# Patient Record
Sex: MALE | Race: WHITE | NOT HISPANIC OR LATINO | Employment: FULL TIME | URBAN - METROPOLITAN AREA
[De-identification: names, ages, dates, MRNs, and addresses within clinical notes are randomized per-mention and may not be internally consistent; named-entity substitution may affect disease eponyms.]

---

## 2017-08-14 DIAGNOSIS — Z12.5 SCREENING PSA (PROSTATE SPECIFIC ANTIGEN): ICD-10-CM

## 2017-08-14 DIAGNOSIS — Z00.00 ANNUAL PHYSICAL EXAM: Primary | ICD-10-CM

## 2017-08-17 ENCOUNTER — RESULTS ENCOUNTER (OUTPATIENT)
Dept: FAMILY MEDICINE CLINIC | Facility: CLINIC | Age: 67
End: 2017-08-17

## 2017-08-17 DIAGNOSIS — Z12.5 SCREENING PSA (PROSTATE SPECIFIC ANTIGEN): ICD-10-CM

## 2017-08-17 DIAGNOSIS — Z00.00 ANNUAL PHYSICAL EXAM: ICD-10-CM

## 2017-08-18 LAB
ALBUMIN SERPL-MCNC: 4.3 G/DL (ref 3.5–5.2)
ALBUMIN/GLOB SERPL: 1.7 G/DL
ALP SERPL-CCNC: 74 U/L (ref 39–117)
ALT SERPL-CCNC: 23 U/L (ref 1–41)
APPEARANCE UR: CLEAR
AST SERPL-CCNC: 23 U/L (ref 1–40)
BASOPHILS # BLD AUTO: 0.01 10*3/MM3 (ref 0–0.2)
BASOPHILS NFR BLD AUTO: 0.2 % (ref 0–1.5)
BILIRUB SERPL-MCNC: 1.7 MG/DL (ref 0.1–1.2)
BILIRUB UR QL STRIP: NEGATIVE
BUN SERPL-MCNC: 13 MG/DL (ref 8–23)
BUN/CREAT SERPL: 13.7 (ref 7–25)
CALCIUM SERPL-MCNC: 9.3 MG/DL (ref 8.6–10.5)
CHLORIDE SERPL-SCNC: 102 MMOL/L (ref 98–107)
CHOLEST SERPL-MCNC: 184 MG/DL (ref 0–200)
CO2 SERPL-SCNC: 29.7 MMOL/L (ref 22–29)
COLOR UR: YELLOW
CREAT SERPL-MCNC: 0.95 MG/DL (ref 0.76–1.27)
EOSINOPHIL # BLD AUTO: 0.23 10*3/MM3 (ref 0–0.7)
EOSINOPHIL NFR BLD AUTO: 3.9 % (ref 0.3–6.2)
ERYTHROCYTE [DISTWIDTH] IN BLOOD BY AUTOMATED COUNT: 12.8 % (ref 11.5–14.5)
GLOBULIN SER CALC-MCNC: 2.6 GM/DL
GLUCOSE SERPL-MCNC: 95 MG/DL (ref 65–99)
GLUCOSE UR QL: NEGATIVE
HCT VFR BLD AUTO: 47.8 % (ref 40.4–52.2)
HDLC SERPL-MCNC: 49 MG/DL (ref 40–60)
HGB BLD-MCNC: 15.9 G/DL (ref 13.7–17.6)
HGB UR QL STRIP: NEGATIVE
IMM GRANULOCYTES # BLD: 0 10*3/MM3 (ref 0–0.03)
IMM GRANULOCYTES NFR BLD: 0 % (ref 0–0.5)
KETONES UR QL STRIP: NEGATIVE
LDLC SERPL CALC-MCNC: 118 MG/DL (ref 0–100)
LEUKOCYTE ESTERASE UR QL STRIP: NEGATIVE
LYMPHOCYTES # BLD AUTO: 1.78 10*3/MM3 (ref 0.9–4.8)
LYMPHOCYTES NFR BLD AUTO: 29.8 % (ref 19.6–45.3)
MCH RBC QN AUTO: 31.5 PG (ref 27–32.7)
MCHC RBC AUTO-ENTMCNC: 33.3 G/DL (ref 32.6–36.4)
MCV RBC AUTO: 94.7 FL (ref 79.8–96.2)
MONOCYTES # BLD AUTO: 0.49 10*3/MM3 (ref 0.2–1.2)
MONOCYTES NFR BLD AUTO: 8.2 % (ref 5–12)
NEUTROPHILS # BLD AUTO: 3.46 10*3/MM3 (ref 1.9–8.1)
NEUTROPHILS NFR BLD AUTO: 57.9 % (ref 42.7–76)
NITRITE UR QL STRIP: NEGATIVE
PH UR STRIP: 7 [PH] (ref 5–8)
PLATELET # BLD AUTO: 188 10*3/MM3 (ref 140–500)
POTASSIUM SERPL-SCNC: 4.6 MMOL/L (ref 3.5–5.2)
PROT SERPL-MCNC: 6.9 G/DL (ref 6–8.5)
PROT UR QL STRIP: NEGATIVE
PSA SERPL-MCNC: 1.49 NG/ML (ref 0–4)
RBC # BLD AUTO: 5.05 10*6/MM3 (ref 4.6–6)
SODIUM SERPL-SCNC: 141 MMOL/L (ref 136–145)
SP GR UR: 1.01 (ref 1–1.03)
TRIGL SERPL-MCNC: 85 MG/DL (ref 0–150)
TSH SERPL DL<=0.005 MIU/L-ACNC: 4.38 MIU/ML (ref 0.27–4.2)
UROBILINOGEN UR STRIP-MCNC: NORMAL MG/DL
VLDLC SERPL CALC-MCNC: 17 MG/DL (ref 5–40)
WBC # BLD AUTO: 5.97 10*3/MM3 (ref 4.5–10.7)

## 2017-09-22 ENCOUNTER — OFFICE VISIT (OUTPATIENT)
Dept: FAMILY MEDICINE CLINIC | Facility: CLINIC | Age: 67
End: 2017-09-22

## 2017-09-22 VITALS
SYSTOLIC BLOOD PRESSURE: 108 MMHG | HEART RATE: 63 BPM | OXYGEN SATURATION: 98 % | WEIGHT: 231 LBS | BODY MASS INDEX: 30.62 KG/M2 | HEIGHT: 73 IN | TEMPERATURE: 98 F | DIASTOLIC BLOOD PRESSURE: 58 MMHG

## 2017-09-22 DIAGNOSIS — E78.5 HYPERLIPIDEMIA, UNSPECIFIED HYPERLIPIDEMIA TYPE: ICD-10-CM

## 2017-09-22 DIAGNOSIS — L98.9 SKIN LESION OF FACE: ICD-10-CM

## 2017-09-22 DIAGNOSIS — Z00.00 ANNUAL PHYSICAL EXAM: Primary | ICD-10-CM

## 2017-09-22 PROCEDURE — 99397 PER PM REEVAL EST PAT 65+ YR: CPT | Performed by: FAMILY MEDICINE

## 2017-09-22 NOTE — PATIENT INSTRUCTIONS
This is an extremely nice 67-year-old who is here for routine annual examination.  I have given him copies of his blood work but would like him to call if there is any problem.

## 2017-09-22 NOTE — PROGRESS NOTES
Subjective   Richard Dowling is a 67 y.o. male presenting with   Chief Complaint   Patient presents with   • Annual Exam   • Labs Only     results from 8-18-17        HPI Comments: 67-year-old  white male nonsmoker here for routine annual examination.  He sees a urologist so he declines a digital rectal exam here today.  He also has not had a colonoscopy since he was 56 years old so I will request that as well.  He has a dermatologist but has not seen them lately and has noticed a small lesion on the right temple that bled when he picked at it a few days ago.       The following portions of the patient's history were reviewed and updated as appropriate: current medications, past family history, past medical history, past social history, past surgical history and problem list.    Review of Systems   Skin: Positive for rash.   All other systems reviewed and are negative.      Objective   Physical Exam   Constitutional: He is oriented to person, place, and time. He appears well-developed and well-nourished. No distress.   HENT:   Head: Normocephalic and atraumatic.       Right Ear: External ear normal.   Left Ear: External ear normal.   Mouth/Throat: Oropharynx is clear and moist. No oropharyngeal exudate.   Eyes: EOM are normal. Pupils are equal, round, and reactive to light. No scleral icterus.   Neck: Normal range of motion. Neck supple. No JVD present. No thyromegaly present.   Cardiovascular: Normal rate, regular rhythm, normal heart sounds and intact distal pulses.  Exam reveals no friction rub.    No murmur heard.  Pulmonary/Chest: Effort normal and breath sounds normal. No respiratory distress. He has no wheezes. He has no rales.   Abdominal: Soft. Bowel sounds are normal. He exhibits no distension and no mass. There is no tenderness. There is no guarding. Hernia confirmed negative in the right inguinal area and confirmed negative in the left inguinal area.   Genitourinary: Testes normal and penis normal.  Right testis shows no mass and no tenderness. Left testis shows no mass and no tenderness.   Musculoskeletal: Normal range of motion. He exhibits no edema, tenderness or deformity.   Lymphadenopathy:     He has no cervical adenopathy. No inguinal adenopathy noted on the right or left side.   Neurological: He is alert and oriented to person, place, and time. No cranial nerve deficit. Coordination normal.   Skin: Skin is dry. No rash noted. He is not diaphoretic.   Psychiatric: He has a normal mood and affect. His behavior is normal.   Nursing note and vitals reviewed.      Assessment/Plan   Richard was seen today for annual exam and labs only.    Diagnoses and all orders for this visit:    Annual physical exam  -     Ambulatory Referral to Gastroenterology    Hyperlipidemia, unspecified hyperlipidemia type    Skin lesion of face  -     Ambulatory Referral to Dermatology                   I would like him to return for another visit in 1 year(s)

## 2017-09-26 ENCOUNTER — TELEPHONE (OUTPATIENT)
Dept: FAMILY MEDICINE CLINIC | Facility: CLINIC | Age: 67
End: 2017-09-26

## 2017-09-26 NOTE — TELEPHONE ENCOUNTER
Pt called and was wondering why there would be a difference in PSA.    Yours was 1.49 this year and he said urologist also did one this year and it was 2.44.    I called back and asked for the date of the urologist not sure if he will call back.    Wants to discuss with you. thanks

## 2017-09-27 NOTE — TELEPHONE ENCOUNTER
Urology measured it in June, and we measured it 3 months later.  PSA goes up and down depending on inflammation, as well as size of the prostate, so maybe the prostate was a little more inflamed when the urologist saw him

## 2017-10-26 ENCOUNTER — PREP FOR SURGERY (OUTPATIENT)
Dept: OTHER | Facility: HOSPITAL | Age: 67
End: 2017-10-26

## 2017-10-26 DIAGNOSIS — Z12.11 COLON CANCER SCREENING: Primary | ICD-10-CM

## 2017-10-31 PROBLEM — Z12.11 COLON CANCER SCREENING: Status: ACTIVE | Noted: 2017-10-31

## 2017-12-21 ENCOUNTER — ANESTHESIA EVENT (OUTPATIENT)
Dept: GASTROENTEROLOGY | Facility: HOSPITAL | Age: 67
End: 2017-12-21

## 2017-12-21 ENCOUNTER — HOSPITAL ENCOUNTER (OUTPATIENT)
Facility: HOSPITAL | Age: 67
Setting detail: HOSPITAL OUTPATIENT SURGERY
Discharge: HOME | End: 2017-12-21
Attending: INTERNAL MEDICINE | Admitting: INTERNAL MEDICINE

## 2017-12-21 ENCOUNTER — ANESTHESIA (OUTPATIENT)
Dept: GASTROENTEROLOGY | Facility: HOSPITAL | Age: 67
End: 2017-12-21

## 2017-12-21 VITALS
OXYGEN SATURATION: 98 % | BODY MASS INDEX: 31.01 KG/M2 | TEMPERATURE: 97.7 F | SYSTOLIC BLOOD PRESSURE: 122 MMHG | HEIGHT: 73 IN | RESPIRATION RATE: 16 BRPM | DIASTOLIC BLOOD PRESSURE: 76 MMHG | WEIGHT: 234 LBS | HEART RATE: 49 BPM

## 2017-12-21 DIAGNOSIS — Z12.11 COLON CANCER SCREENING: ICD-10-CM

## 2017-12-21 PROCEDURE — S0260 H&P FOR SURGERY: HCPCS | Performed by: INTERNAL MEDICINE

## 2017-12-21 PROCEDURE — 88305 TISSUE EXAM BY PATHOLOGIST: CPT | Performed by: INTERNAL MEDICINE

## 2017-12-21 PROCEDURE — 45380 COLONOSCOPY AND BIOPSY: CPT | Performed by: INTERNAL MEDICINE

## 2017-12-21 PROCEDURE — 45385 COLONOSCOPY W/LESION REMOVAL: CPT | Performed by: INTERNAL MEDICINE

## 2017-12-21 PROCEDURE — 25010000002 PROPOFOL 10 MG/ML EMULSION: Performed by: ANESTHESIOLOGY

## 2017-12-21 RX ORDER — PROPOFOL 10 MG/ML
VIAL (ML) INTRAVENOUS CONTINUOUS PRN
Status: DISCONTINUED | OUTPATIENT
Start: 2017-12-21 | End: 2017-12-21 | Stop reason: SURG

## 2017-12-21 RX ORDER — LIDOCAINE HYDROCHLORIDE 20 MG/ML
INJECTION, SOLUTION INFILTRATION; PERINEURAL AS NEEDED
Status: DISCONTINUED | OUTPATIENT
Start: 2017-12-21 | End: 2017-12-21 | Stop reason: SURG

## 2017-12-21 RX ORDER — SODIUM CHLORIDE, SODIUM LACTATE, POTASSIUM CHLORIDE, CALCIUM CHLORIDE 600; 310; 30; 20 MG/100ML; MG/100ML; MG/100ML; MG/100ML
1000 INJECTION, SOLUTION INTRAVENOUS CONTINUOUS PRN
Status: DISCONTINUED | OUTPATIENT
Start: 2017-12-21 | End: 2017-12-21 | Stop reason: HOSPADM

## 2017-12-21 RX ORDER — ASPIRIN 81 MG/1
81 TABLET ORAL DAILY
Status: ON HOLD | COMMUNITY
End: 2022-12-06

## 2017-12-21 RX ORDER — LIDOCAINE HYDROCHLORIDE 10 MG/ML
0.5 INJECTION, SOLUTION INFILTRATION; PERINEURAL ONCE AS NEEDED
Status: DISCONTINUED | OUTPATIENT
Start: 2017-12-21 | End: 2017-12-21 | Stop reason: HOSPADM

## 2017-12-21 RX ORDER — PROPOFOL 10 MG/ML
VIAL (ML) INTRAVENOUS AS NEEDED
Status: DISCONTINUED | OUTPATIENT
Start: 2017-12-21 | End: 2017-12-21 | Stop reason: SURG

## 2017-12-21 RX ORDER — SODIUM CHLORIDE 0.9 % (FLUSH) 0.9 %
3 SYRINGE (ML) INJECTION AS NEEDED
Status: DISCONTINUED | OUTPATIENT
Start: 2017-12-21 | End: 2017-12-21 | Stop reason: HOSPADM

## 2017-12-21 RX ADMIN — PROPOFOL 100 MG: 10 INJECTION, EMULSION INTRAVENOUS at 09:40

## 2017-12-21 RX ADMIN — SODIUM CHLORIDE, POTASSIUM CHLORIDE, SODIUM LACTATE AND CALCIUM CHLORIDE 1000 ML: 600; 310; 30; 20 INJECTION, SOLUTION INTRAVENOUS at 09:08

## 2017-12-21 RX ADMIN — PROPOFOL 100 MCG/KG/MIN: 10 INJECTION, EMULSION INTRAVENOUS at 09:40

## 2017-12-21 RX ADMIN — LIDOCAINE HYDROCHLORIDE 60 MG: 20 INJECTION, SOLUTION INFILTRATION; PERINEURAL at 09:40

## 2017-12-21 RX ADMIN — SODIUM CHLORIDE, POTASSIUM CHLORIDE, SODIUM LACTATE AND CALCIUM CHLORIDE: 600; 310; 30; 20 INJECTION, SOLUTION INTRAVENOUS at 09:40

## 2017-12-21 NOTE — PLAN OF CARE
Problem: Patient Care Overview (Adult)  Goal: Plan of Care Review  Outcome: Ongoing (interventions implemented as appropriate)   12/21/17 0914   Coping/Psychosocial Response Interventions   Plan Of Care Reviewed With patient   Patient Care Overview   Progress no change   Outcome Evaluation   Outcome Summary/Follow up Plan pending procedure results     Goal: Adult Individualization and Mutuality  Outcome: Ongoing (interventions implemented as appropriate)   12/21/17 0914   Individualization   Patient Specific Preferences goes by Richard   Mutuality/Individual Preferences   What Anxieties, Fears or Concerns Do You Have About Your Health or Care? none     Goal: Discharge Needs Assessment  Outcome: Ongoing (interventions implemented as appropriate)   12/21/17 0914   Discharge Needs Assessment   Concerns To Be Addressed no discharge needs identified   Discharge Disposition home or self-care   Living Environment   Transportation Available car       Problem: GI Endoscopy (Adult)  Goal: Signs and Symptoms of Listed Potential Problems Will be Absent or Manageable (GI Endoscopy)  Outcome: Ongoing (interventions implemented as appropriate)   12/21/17 0914   GI Endoscopy   Problems Assessed (GI Endoscopy) pain;bleeding;hypoxia/hypoxemia   Problems Present (GI Endoscopy) none

## 2017-12-21 NOTE — ANESTHESIA PREPROCEDURE EVALUATION
Anesthesia Evaluation     Patient summary reviewed and Nursing notes reviewed          Airway   Mallampati: I  TM distance: <3 FB  Neck ROM: full  no difficulty expected  Dental - normal exam     Pulmonary - negative pulmonary ROS and normal exam   Cardiovascular - normal exam    (+) hyperlipidemia      Neuro/Psych- negative ROS  GI/Hepatic/Renal/Endo - negative ROS     Musculoskeletal (-) negative ROS    Abdominal  - normal exam    Bowel sounds: normal.   Substance History - negative use     OB/GYN negative ob/gyn ROS         Other                                        Anesthesia Plan    ASA 2     MAC     Anesthetic plan and risks discussed with patient.

## 2017-12-21 NOTE — ANESTHESIA POSTPROCEDURE EVALUATION
"Patient: Richard Dowling    Procedure Summary     Date Anesthesia Start Anesthesia Stop Room / Location    12/21/17 0943 1016  [facility]       Procedure Diagnosis Surgeon Provider    COLONOSCOPY to cecum with cold snare polypectomies (N/A ) Colon cancer screening  (Colon cancer screening [Z12.11]) MD José Miguel Banda MD          Anesthesia Type: MAC  Last vitals  BP   115/73 (12/21/17 1031)   Temp   36.5 °C (97.7 °F) (12/21/17 1031)   Pulse   50 (12/21/17 1031)   Resp   16 (12/21/17 1031)     SpO2   98 % (12/21/17 1031)     Post Anesthesia Care and Evaluation    Patient location during evaluation: PACU  Patient participation: complete - patient participated  Level of consciousness: awake  Pain score: 0  Pain management: adequate  Airway patency: patent  Anesthetic complications: No anesthetic complications  PONV Status: none  Cardiovascular status: acceptable  Respiratory status: acceptable  Hydration status: acceptable    Comments: /73 (BP Location: Left arm, Patient Position: Lying)  Pulse 50  Temp 36.5 °C (97.7 °F)  Resp 16  Ht 185.4 cm (73\")  Wt 106 kg (234 lb)  SpO2 98%  BMI 30.87 kg/m2      "

## 2017-12-21 NOTE — H&P
"[facility]  Pre Procedure History & Physical    Chief Complaint:   Time for my colonoscopy    Subjective     HPI:   67 y.o. male screening    Past Medical History:   Past Medical History:   Diagnosis Date   • Enlarged prostate    • Muscle wasting and atrophy, not elsewhere classified, left lower leg     unknown etiology       Family History:  History reviewed. No pertinent family history.    Social History:   reports that he has never smoked. He has never used smokeless tobacco. He reports that he drinks about 1.2 oz of alcohol per week  He reports that he does not use illicit drugs.    Medications:   Prescriptions Prior to Admission   Medication Sig Dispense Refill Last Dose   • aspirin 81 MG EC tablet Take 81 mg by mouth Daily.   12/20/2017 at 0900   • B Complex Vitamins (B COMPLEX 100 PO) Take  by mouth.   12/20/2017 at 0900   • Flaxseed, Linseed, (FLAXSEED OIL) 1000 MG capsule Take  by mouth.   12/20/2017 at 0900   • Multiple Vitamins-Minerals (MULTI COMPLETE PO) Take  by mouth daily.   12/20/2017 at 0900   • tamsulosin (FLOMAX) 0.4 MG capsule 24 hr capsule TK ONE C PO  QHS  0 12/19/2017 at 2100       Allergies:  Sulfa antibiotics    ROS:    Pertinent items are noted in HPI     Objective     Blood pressure 118/69, pulse (!) 46, temperature 97.6 °F (36.4 °C), temperature source Oral, resp. rate 16, height 185.4 cm (73\"), weight 106 kg (234 lb), SpO2 99 %.    Physical Exam   Constitutional: Pt is oriented to person, place, and time and well-developed, well-nourished, and in no distress.   HENT:   Mouth/Throat: Oropharynx is clear and moist.   Neck: Normal range of motion. Neck supple.   Cardiovascular: Normal rate, regular rhythm and normal heart sounds.    Pulmonary/Chest: Effort normal and breath sounds normal. No respiratory distress. No  wheezes.   Abdominal: Soft. Bowel sounds are normal.   Skin: Skin is warm and dry.   Psychiatric: Mood, memory, affect and judgment normal. "     Assessment/Plan     Diagnosis:  Encounter for screening for colon cancer    Anticipated Surgical Procedure:  Colonoscopy    The risks, benefits, and alternatives of this procedure have been discussed with the patient or the responsible party- the patient understands and agrees to proceed.

## 2017-12-22 LAB
CYTO UR: NORMAL
LAB AP CASE REPORT: NORMAL
Lab: NORMAL
PATH REPORT.FINAL DX SPEC: NORMAL
PATH REPORT.GROSS SPEC: NORMAL

## 2018-01-11 ENCOUNTER — TELEPHONE (OUTPATIENT)
Dept: GASTROENTEROLOGY | Facility: CLINIC | Age: 68
End: 2018-01-11

## 2018-01-11 NOTE — TELEPHONE ENCOUNTER
----- Message from Castillo Ledezma MD sent at 12/24/2017 12:03 PM EST -----  Path tub ad, colon recall 3 yrs  Patient called, advised of Dr. Ledezma's note. He verb understanding and is in agreement with the plan. Patient's health maintenance record updated to reflect the need to repeat colonoscopy in 3 years.

## 2018-12-18 DIAGNOSIS — E78.2 MIXED HYPERLIPIDEMIA: Primary | ICD-10-CM

## 2018-12-18 DIAGNOSIS — R33.9 INCOMPLETE BLADDER EMPTYING: ICD-10-CM

## 2018-12-19 LAB
ALBUMIN SERPL-MCNC: 3.8 G/DL (ref 3.5–5.2)
ALBUMIN/GLOB SERPL: 1.6 G/DL
ALP SERPL-CCNC: 89 U/L (ref 39–117)
ALT SERPL-CCNC: 28 U/L (ref 1–41)
AST SERPL-CCNC: 26 U/L (ref 1–40)
BILIRUB SERPL-MCNC: 1 MG/DL (ref 0.1–1.2)
BUN SERPL-MCNC: 13 MG/DL (ref 8–23)
BUN/CREAT SERPL: 12.9 (ref 7–25)
CALCIUM SERPL-MCNC: 8.8 MG/DL (ref 8.6–10.5)
CHLORIDE SERPL-SCNC: 106 MMOL/L (ref 98–107)
CHOLEST SERPL-MCNC: 207 MG/DL (ref 0–200)
CO2 SERPL-SCNC: 28.4 MMOL/L (ref 22–29)
CREAT SERPL-MCNC: 1.01 MG/DL (ref 0.76–1.27)
GLOBULIN SER CALC-MCNC: 2.4 GM/DL
GLUCOSE SERPL-MCNC: 96 MG/DL (ref 65–99)
HDLC SERPL-MCNC: 46 MG/DL (ref 40–60)
LDLC SERPL CALC-MCNC: 144 MG/DL (ref 0–100)
LDLC/HDLC SERPL: 3.12 {RATIO}
POTASSIUM SERPL-SCNC: 4.4 MMOL/L (ref 3.5–5.2)
PROT SERPL-MCNC: 6.2 G/DL (ref 6–8.5)
PSA SERPL-MCNC: 1.61 NG/ML (ref 0–4)
SODIUM SERPL-SCNC: 142 MMOL/L (ref 136–145)
TRIGL SERPL-MCNC: 87 MG/DL (ref 0–150)
VLDLC SERPL CALC-MCNC: 17.4 MG/DL (ref 5–40)

## 2018-12-26 ENCOUNTER — OFFICE VISIT (OUTPATIENT)
Dept: FAMILY MEDICINE CLINIC | Facility: CLINIC | Age: 68
End: 2018-12-26

## 2018-12-26 VITALS
HEART RATE: 55 BPM | OXYGEN SATURATION: 98 % | SYSTOLIC BLOOD PRESSURE: 120 MMHG | BODY MASS INDEX: 32.05 KG/M2 | TEMPERATURE: 97.9 F | WEIGHT: 241.8 LBS | HEIGHT: 73 IN | DIASTOLIC BLOOD PRESSURE: 72 MMHG

## 2018-12-26 DIAGNOSIS — N40.0 ENLARGED PROSTATE: ICD-10-CM

## 2018-12-26 DIAGNOSIS — Z23 NEED FOR IMMUNIZATION AGAINST INFLUENZA: ICD-10-CM

## 2018-12-26 DIAGNOSIS — E78.5 HYPERLIPIDEMIA, UNSPECIFIED HYPERLIPIDEMIA TYPE: Primary | ICD-10-CM

## 2018-12-26 DIAGNOSIS — Z23 ENCOUNTER FOR IMMUNIZATION: ICD-10-CM

## 2018-12-26 DIAGNOSIS — Z00.00 MEDICARE ANNUAL WELLNESS VISIT, INITIAL: ICD-10-CM

## 2018-12-26 PROCEDURE — G0009 ADMIN PNEUMOCOCCAL VACCINE: HCPCS | Performed by: FAMILY MEDICINE

## 2018-12-26 PROCEDURE — G0402 INITIAL PREVENTIVE EXAM: HCPCS | Performed by: FAMILY MEDICINE

## 2018-12-26 PROCEDURE — 90662 IIV NO PRSV INCREASED AG IM: CPT | Performed by: FAMILY MEDICINE

## 2018-12-26 PROCEDURE — G0008 ADMIN INFLUENZA VIRUS VAC: HCPCS | Performed by: FAMILY MEDICINE

## 2018-12-26 PROCEDURE — 90670 PCV13 VACCINE IM: CPT | Performed by: FAMILY MEDICINE

## 2018-12-26 NOTE — PROGRESS NOTES
QUICK REFERENCE INFORMATION:  The ABCs of the Annual Wellness Visit    Initial Medicare Wellness Visit    HEALTH RISK ASSESSMENT    1950    Recent Hospitalizations:  No hospitalization(s) within the last year..        Current Medical Providers:  Patient Care Team:  Hermelindo Fraire MD as PCP - General  Hermelindo Fraire MD as PCP - Family Medicine        Smoking Status:  Social History     Tobacco Use   Smoking Status Never Smoker   Smokeless Tobacco Never Used       Alcohol Consumption:  Social History     Substance and Sexual Activity   Alcohol Use Yes   • Alcohol/week: 1.2 oz   • Types: 2 Glasses of wine per week       Depression Screen:   No flowsheet data found.    Health Habits and Functional and Cognitive Screening:  No flowsheet data found.        Does the patient have evidence of cognitive impairment? No    Asiprin use counseling: Taking ASA appropriately as indicated      Recent Lab Results:    Visual Acuity:  No exam data present    Age-appropriate Screening Schedule:  Refer to the list below for future screening recommendations based on patient's age, sex and/or medical conditions. Orders for these recommended tests are listed in the plan section. The patient has been provided with a written plan.    Health Maintenance   Topic Date Due   • TDAP/TD VACCINES (1 - Tdap) 07/17/1970   • ZOSTER VACCINE (1 of 2) 07/17/2001   • PNEUMOCOCCAL VACCINES (65+ LOW/MEDIUM RISK) (1 of 2 - PCV13) 07/17/2016   • INFLUENZA VACCINE  08/01/2018   • PROSTATE CANCER SCREENING  12/19/2019   • LIPID PANEL  12/19/2019   • COLONOSCOPY  12/21/2020        Subjective   History of Present Illness    Richard Dowling is a 68 y.o. male who presents for an Annual Wellness Visit.    The following portions of the patient's history were reviewed and updated as appropriate: current medications, past family history, past medical history, past social history, past surgical history and problem list.    Outpatient Medications Prior to Visit    Medication Sig Dispense Refill   • aspirin 81 MG EC tablet Take 81 mg by mouth Daily.     • B Complex Vitamins (B COMPLEX 100 PO) Take  by mouth.     • Flaxseed, Linseed, (FLAXSEED OIL) 1000 MG capsule Take  by mouth.     • Multiple Vitamins-Minerals (MULTI COMPLETE PO) Take  by mouth daily.     • tamsulosin (FLOMAX) 0.4 MG capsule 24 hr capsule TK ONE C PO  QHS  0     No facility-administered medications prior to visit.        Patient Active Problem List   Diagnosis   • Abnormal prostate specific antigen   • HLD (hyperlipidemia)   • B12 deficiency   • Medicare annual wellness visit, initial   • Colon cancer screening   • Enlarged prostate       Advance Care Planning:  power of  for healthcare on file    Identification of Risk Factors:  Risk factors include: weight , unhealthy diet and cardiovascular risk.    Review of Systems   Genitourinary: Positive for difficulty urinating (mildly slow stream followed by his urologist).   Musculoskeletal: Positive for arthralgias (modest low back pain from collapsed vertebrae).   All other systems reviewed and are negative.      Compared to one year ago, the patient feels his physical health is the same.  Compared to one year ago, the patient feels his mental health is the same.    Objective     Physical Exam   Constitutional: He is oriented to person, place, and time. He appears well-developed and well-nourished. No distress.   HENT:   Head: Normocephalic and atraumatic.   Right Ear: External ear normal.   Left Ear: External ear normal.   Mouth/Throat: Oropharynx is clear and moist.   Eyes: Conjunctivae and EOM are normal. Pupils are equal, round, and reactive to light. No scleral icterus.   Neck: Normal range of motion. Neck supple. No thyromegaly present.   Cardiovascular: Normal rate, regular rhythm, normal heart sounds and intact distal pulses. Exam reveals no friction rub.   No murmur heard.  Pulmonary/Chest: Effort normal and breath sounds normal. No stridor. No  "respiratory distress.   Abdominal: Soft. Bowel sounds are normal. He exhibits no distension. There is no tenderness. There is no guarding.   Musculoskeletal: Normal range of motion. He exhibits no edema or tenderness.   Lymphadenopathy:     He has no cervical adenopathy.   Neurological: He is alert and oriented to person, place, and time. He displays normal reflexes. No cranial nerve deficit. Coordination normal.   Skin: Skin is warm and dry. No rash noted. He is not diaphoretic.   Psychiatric: He has a normal mood and affect. His behavior is normal.   Nursing note and vitals reviewed.      Vitals:    12/26/18 1049   BP: 120/72   Pulse: 55   Temp: 97.9 °F (36.6 °C)   SpO2: 98%   Weight: 110 kg (241 lb 12.8 oz)   Height: 185.4 cm (73\")       Patient's Body mass index is 31.9 kg/m². BMI is above normal parameters. Recommendations include: exercise counseling and nutrition counseling.      Assessment/Plan   Patient Self-Management and Personalized Health Advice  The patient has been provided with information about: diet, exercise and weight management and preventive services including:   · Pneumococcal vaccine .    Visit Diagnoses:    ICD-10-CM ICD-9-CM   1. Hyperlipidemia, unspecified hyperlipidemia type E78.5 272.4   2. Enlarged prostate N40.0 600.00   3. Medicare annual wellness visit, initial Z00.00 V70.0       No orders of the defined types were placed in this encounter.      Outpatient Encounter Medications as of 12/26/2018   Medication Sig Dispense Refill   • aspirin 81 MG EC tablet Take 81 mg by mouth Daily.     • B Complex Vitamins (B COMPLEX 100 PO) Take  by mouth.     • Flaxseed, Linseed, (FLAXSEED OIL) 1000 MG capsule Take  by mouth.     • Multiple Vitamins-Minerals (MULTI COMPLETE PO) Take  by mouth daily.     • tamsulosin (FLOMAX) 0.4 MG capsule 24 hr capsule TK ONE C PO  QHS  0     No facility-administered encounter medications on file as of 12/26/2018.        Reviewed use of high risk medication in the " elderly: not applicable  Reviewed for potential of harmful drug interactions in the elderly: not applicable    Follow Up:  Return in about 1 year (around 12/26/2019) for Recheck.     An After Visit Summary and PPPS with all of these plans were given to the patient.

## 2018-12-26 NOTE — PATIENT INSTRUCTIONS
This is a very nice 68-year-old who is here for follow-up on his cholesterol and his Medicare wellness visit.  I would like him to call if there is any problem.

## 2019-01-07 ENCOUNTER — TELEPHONE (OUTPATIENT)
Dept: FAMILY MEDICINE CLINIC | Facility: CLINIC | Age: 69
End: 2019-01-07

## 2019-01-07 DIAGNOSIS — M54.50 ACUTE MIDLINE LOW BACK PAIN WITHOUT SCIATICA: Primary | ICD-10-CM

## 2019-01-07 RX ORDER — BACLOFEN 10 MG/1
10 TABLET ORAL 3 TIMES DAILY PRN
Qty: 20 TABLET | Refills: 1 | Status: ON HOLD | OUTPATIENT
Start: 2019-01-07 | End: 2022-12-06

## 2019-01-07 RX ORDER — BACLOFEN 10 MG/1
TABLET ORAL
Qty: 300 TABLET | Refills: 1 | OUTPATIENT
Start: 2019-01-07

## 2019-01-07 NOTE — TELEPHONE ENCOUNTER
Last seen 12/26/18  Pt had fall from ladder went to ER and saw by dr pham   Gave him tylenol and ibuprofen     Would like muscle relaxer and referral to PT ?

## 2019-01-15 ENCOUNTER — TREATMENT (OUTPATIENT)
Dept: PHYSICAL THERAPY | Facility: CLINIC | Age: 69
End: 2019-01-15

## 2019-01-15 DIAGNOSIS — S29.019D ACUTE THORACIC MYOFASCIAL STRAIN, SUBSEQUENT ENCOUNTER: Primary | ICD-10-CM

## 2019-01-15 PROCEDURE — 97035 APP MDLTY 1+ULTRASOUND EA 15: CPT | Performed by: PHYSICAL THERAPIST

## 2019-01-15 PROCEDURE — 97140 MANUAL THERAPY 1/> REGIONS: CPT | Performed by: PHYSICAL THERAPIST

## 2019-01-15 PROCEDURE — 97161 PT EVAL LOW COMPLEX 20 MIN: CPT | Performed by: PHYSICAL THERAPIST

## 2019-01-15 NOTE — PROGRESS NOTES
"Physical Therapy Initial Evaluation and Plan of Care      Patient: Richard Dowling III   : 1950  Diagnosis/ICD-10 Code:  Acute thoracic myofascial strain, subsequent encounter [S29.019D]  Referring practitioner: Hermelindo Fraire MD    Subjective Evaluation    History of Present Illness  Mechanism of injury: LAst Saturday. Fell off ladder putting away Xmas stuff. LAnded on T spine and hit head on concrete in garage.   Went to ER. CT scan (-)   Xray Tspine (-). ? Fracture rib  Pain with supine laying.  Sleep on side but painful  Had episode of abdominal \"splashing\"; ankle swollen  Loosened up neck shoulders. Squishy in neck vs gritty  Tingling in R foot  PMH: L knee meniscus surgery 2018. Feels loose at times.  Can't tdo my 5 miles. Only 1-2 a day      Patient Occupation: general construction/ Pain  Location: T5-9 L; R foot tingling  Quality: dull ache  Relieving factors: medications, ice and heat (IB., muscle relaxors)  Aggravating factors: sleeping (supine laying; cough for a few days, decreasing)  Progression: improved    Social Support  Lives in: multiple-level home  Lives with: spouse             Objective       Active Range of Motion   Cervical/Thoracic Spine     Thoracic   Flexion: WFL  Extension: Active thoracic extension: 25% with pain.   Left lateral flexion: WFL  Right lateral flexion: WFL  Left rotation: Active left thoracic rotation: 25% with pain.   Right rotation: Active right thoracic rotation: 75%     Additional Active Range of Motion Details  Decreased segmental mobility L T 4-8  Tender soft tissue midscap    Strength/Myotome Testing     Left Shoulder     Planes of Motion   Flexion: 4+   Abduction: 5   External rotation at 0°: 5   Internal rotation at 0°: 5     Right Shoulder     Planes of Motion   Flexion: 4+   Abduction: 5   External rotation at 0°: 5   Internal rotation at 0°: 5     Left Elbow   Flexion: 5  Extension: 5    Right Elbow   Flexion: 5  Extension: 5   "       Assessment & Plan     Assessment  Impairments: abnormal muscle tone, abnormal or restricted ROM, activity intolerance, lacks appropriate home exercise program and pain with function  Assessment details: Pt is a good candidate for skilled PT intervention, mary kay manual therapy for spinal joint mobility, alignment, postural restoration and core strengthening to restore functional AROM and strength to return to previous level of ADL's.  Prognosis: good  Prognosis details: STG( 2 weeks)  1.Pt to be independent with HEP  2. Pt to exhibit improved thoracic segmental mobility to allow for increased ease with ADL's  3. Pt to tolerate supine lying  4. Pt to report less pain with sleeping at night    LTG ( 6 weeks)  1. Pt to demonstrate ability to lift 15# overhead  without thoracic pain to resume fitness  2. Pt to demonstrate proper sitting posture to decrease stress on Tspine  3. Pt to exhibit full cervical and thoracic AROM to  to allow for reaching and bending with min to no pain  4. Pt to score <10% on Back Index  Functional Limitations: lifting, sleeping, uncomfortable because of pain, moving in bed, sitting and unable to perform repetitive tasks  Plan  Therapy options: will be seen for skilled physical therapy services  Planned modality interventions: electrical stimulation/Russian stimulation, cryotherapy, thermotherapy (hydrocollator packs) and ultrasound  Planned therapy interventions: abdominal trunk stabilization, body mechanics training, functional ROM exercises, home exercise program, joint mobilization, manual therapy, postural training, soft tissue mobilization, spinal/joint mobilization, stretching, strengthening and therapeutic activities  Frequency: 2x week  Duration in weeks: 6  Treatment plan discussed with: patient        Manual Therapy:    15     mins  91416;  Therapeutic Exercise:    6     mins  21664;     Neuromuscular Peyton:        mins  96224;    Therapeutic Activity:          mins  27436;      Gait Training:           mins  07154;     Ultrasound:     9     mins  64170;    Electrical Stimulation:         mins  04325 ( );  Dry Needling          mins self-pay    Timed Treatment:   30   mins   Total Treatment:     60   mins    PT SIGNATURE: Bina Soto, PT   [place] License # [id number]  DATE TREATMENT INITIATED: 1/16/2019    Medicare Initial Certification  Certification Period: 4/16/2019  I certify that the therapy services are furnished while this patient is under my care.  The services outlined above are required by this patient, and will be reviewed every 90 days.     PHYSICIAN: Hermelindo Fraire MD      DATE:     Please sign and return via fax to [phone].. Thank you, [facility].

## 2019-01-21 ENCOUNTER — TREATMENT (OUTPATIENT)
Dept: PHYSICAL THERAPY | Facility: CLINIC | Age: 69
End: 2019-01-21

## 2019-01-21 PROCEDURE — 97035 APP MDLTY 1+ULTRASOUND EA 15: CPT | Performed by: PHYSICAL THERAPIST

## 2019-01-21 PROCEDURE — 97140 MANUAL THERAPY 1/> REGIONS: CPT | Performed by: PHYSICAL THERAPIST

## 2019-01-21 PROCEDURE — 97110 THERAPEUTIC EXERCISES: CPT | Performed by: PHYSICAL THERAPIST

## 2019-01-21 NOTE — PROGRESS NOTES
"Physical Therapy Daily Progress Note        Subjective     Richard Dowling reports: Feeling better. Was able to sleep on back last night.    Objective   See Exercise, Manual, and Modality Logs for complete treatment.       Assessment/Plan  \"Bruised feeling\" after treatment. Doing well with HEP    Progress per Plan of Care           Manual Therapy:  20       mins  99889;  Therapeutic Exercise: 10      mins  82565;     Neuromuscular Peyton:       mins  59193;    Therapeutic Activity:         mins  90493;     Gait Training:         mins  64620;     Ultrasound:   8      mins  68920;    Electrical Stimulation:        mins  24470 ( );  Dry Needling         mins self-pay    Timed Treatment:   38   mins   Total Treatment:     50   mins    Bina Soto, PT  Physical Therapist  [place] License # [id number]  "

## 2019-01-23 ENCOUNTER — TREATMENT (OUTPATIENT)
Dept: PHYSICAL THERAPY | Facility: CLINIC | Age: 69
End: 2019-01-23

## 2019-01-23 DIAGNOSIS — S29.019D ACUTE THORACIC MYOFASCIAL STRAIN, SUBSEQUENT ENCOUNTER: Primary | ICD-10-CM

## 2019-01-23 PROCEDURE — 97112 NEUROMUSCULAR REEDUCATION: CPT | Performed by: PHYSICAL THERAPIST

## 2019-01-23 PROCEDURE — 97110 THERAPEUTIC EXERCISES: CPT | Performed by: PHYSICAL THERAPIST

## 2019-01-23 PROCEDURE — 97140 MANUAL THERAPY 1/> REGIONS: CPT | Performed by: PHYSICAL THERAPIST

## 2019-01-23 NOTE — PROGRESS NOTES
Physical Therapy Daily Progress Note        Subjective     Richard Dowling reports: Stiff this morning. Noticed L hip gets weak and gives out with going up stairs.      Objective   L hip abduction 4+/5 sore  See Exercise, Manual, and Modality Logs for complete treatment.       Assessment/Plan  Still sore with STM but able to sleep on back and perform more ADL's with less pain    Progress strengthening /stabilization /functional activity           Manual Therapy:  20       mins  30180;  Therapeutic Exercise: 15      mins  54247;     Neuromuscular Peyton:5       mins  50333;    Therapeutic Activity:         mins  79548;     Gait Training:         mins  70815;     Ultrasound:   8      mins  46138;    Electrical Stimulation:        mins  70258 ( );  Dry Needling         mins self-pay    Timed Treatment:   48   mins   Total Treatment:     58   mins    Bina Soto PT  Physical Therapist  [place] License # [id number]

## 2019-01-30 ENCOUNTER — TREATMENT (OUTPATIENT)
Dept: PHYSICAL THERAPY | Facility: CLINIC | Age: 69
End: 2019-01-30

## 2019-01-30 DIAGNOSIS — S29.019D ACUTE THORACIC MYOFASCIAL STRAIN, SUBSEQUENT ENCOUNTER: Primary | ICD-10-CM

## 2019-01-30 PROCEDURE — 97110 THERAPEUTIC EXERCISES: CPT | Performed by: PHYSICAL THERAPIST

## 2019-01-30 PROCEDURE — 97140 MANUAL THERAPY 1/> REGIONS: CPT | Performed by: PHYSICAL THERAPIST

## 2019-01-30 NOTE — PROGRESS NOTES
Physical Therapy Daily Progress Note        Subjective     Richard Dowling reports: I was hurting for 3 days after last session but better now.     Objective   See Exercise, Manual, and Modality Logs for complete treatment.       Assessment/Plan  Felt good after treatment. Improved balance with SLS. No pain with therex    Progress per Plan of Care           Manual Therapy:  20       mins  30371;  Therapeutic Exercise: 20      mins  56743;     Neuromuscular Peyton:       mins  98305;    Therapeutic Activity:         mins  33148;     Gait Training:         mins  08748;     Ultrasound:   8      mins  07963;    Electrical Stimulation:        mins  99951 ( );  Dry Needling         mins self-pay    Timed Treatment:   48   mins   Total Treatment:     50   mins    Bina Soto, PT  Physical Therapist  [place] License # [id number]

## 2019-02-01 ENCOUNTER — TREATMENT (OUTPATIENT)
Dept: PHYSICAL THERAPY | Facility: CLINIC | Age: 69
End: 2019-02-01

## 2019-02-01 DIAGNOSIS — S29.019D ACUTE THORACIC MYOFASCIAL STRAIN, SUBSEQUENT ENCOUNTER: Primary | ICD-10-CM

## 2019-02-01 PROCEDURE — 97110 THERAPEUTIC EXERCISES: CPT | Performed by: PHYSICAL THERAPIST

## 2019-02-01 PROCEDURE — 97140 MANUAL THERAPY 1/> REGIONS: CPT | Performed by: PHYSICAL THERAPIST

## 2019-02-01 NOTE — PROGRESS NOTES
Physical Therapy Daily Progress Note  Visit #5      Subjective     Richard Dowling reports: feeling pretty good today. Tight but not painful    Objective   See Exercise, Manual, and Modality Logs for complete treatment.       Assessment/Plan  Felt upper back with ball stretch up wall. ABle to do weights with prone exercises today. Smaller area of tenderness at L T4 area    Progress strengthening /stabilization /functional activity           Manual Therapy:  20       mins  24211;  Therapeutic Exercise: 25      mins  76010;     Neuromuscular Peyton:       mins  48532;    Therapeutic Activity:         mins  80968;     Gait Training:         mins  27667;     Ultrasound:         mins  01851;    Electrical Stimulation:        mins  54635 ( );  Dry Needling         mins self-pay    Timed Treatment:   45   mins   Total Treatment:     45   mins    Bina Soto PT  Physical Therapist  [place] License # [id number]

## 2019-02-07 ENCOUNTER — TREATMENT (OUTPATIENT)
Dept: PHYSICAL THERAPY | Facility: CLINIC | Age: 69
End: 2019-02-07

## 2019-02-07 DIAGNOSIS — S29.019D ACUTE THORACIC MYOFASCIAL STRAIN, SUBSEQUENT ENCOUNTER: Primary | ICD-10-CM

## 2019-02-07 PROCEDURE — 97110 THERAPEUTIC EXERCISES: CPT | Performed by: PHYSICAL THERAPIST

## 2019-02-07 PROCEDURE — 97140 MANUAL THERAPY 1/> REGIONS: CPT | Performed by: PHYSICAL THERAPIST

## 2019-02-07 NOTE — PROGRESS NOTES
Physical Therapy Daily Progress Note        Subjective     Richard Dowling reports: Improving. Tender and stiff but less pain. I feel like I am 85% improved. My wife and I plan to start exercising and lifting weights more    Objective   RROT 75%  LROT 100%   See Exercise, Manual, and Modality Logs for complete treatment.       Assessment/Plan  Improving mobility after MWM to T 8-9 seated. RROT 100%. Able to do cable row 17.5# without pain.  Prone middle trap without weight difficult to perform    Progress per Plan of Care           Manual Therapy:  15       mins  41110;  Therapeutic Exercise: 30      mins  15138;     Neuromuscular Peyton:       mins  60876;    Therapeutic Activity:         mins  66465;     Gait Training:         mins  13343;     Ultrasound:         mins  97773;    Electrical Stimulation:        mins  38606 ( );  Dry Needling         mins self-pay    Timed Treatment:   45   mins   Total Treatment:     45   mins    Bina Soto, PT  Physical Therapist  [place] License # [id number]

## 2019-02-12 ENCOUNTER — TREATMENT (OUTPATIENT)
Dept: PHYSICAL THERAPY | Facility: CLINIC | Age: 69
End: 2019-02-12

## 2019-02-12 DIAGNOSIS — S29.019D ACUTE THORACIC MYOFASCIAL STRAIN, SUBSEQUENT ENCOUNTER: Primary | ICD-10-CM

## 2019-02-12 PROCEDURE — 97140 MANUAL THERAPY 1/> REGIONS: CPT | Performed by: PHYSICAL THERAPIST

## 2019-02-12 PROCEDURE — 97110 THERAPEUTIC EXERCISES: CPT | Performed by: PHYSICAL THERAPIST

## 2019-02-12 NOTE — PROGRESS NOTES
"Physical Therapy Daily Progress Note        Subjective     Richard Dowling reports: Back doing oK. Spent too much time this weekend hunched over a desk/table.    Objective   GMAx R 5/5 ; L 4/5  See Exercise, Manual, and Modality Logs for complete treatment.       Assessment/Plan  \"good workout today\". Added quad stretch, corner stretch and glut max strength    Progress strengthening /stabilization /functional activity           Manual Therapy:  20       mins  60437;  Therapeutic Exercise: 25      mins  71158;     Neuromuscular Peyton:       mins  50322;    Therapeutic Activity:         mins  70360;     Gait Training:         mins  40377;     Ultrasound:         mins  98537;    Electrical Stimulation:        mins  51675 ( );  Dry Needling         mins self-pay    Timed Treatment:   45   mins   Total Treatment:     45   mins    Bina Soto PT  Physical Therapist  [place] License # [id number]  "

## 2019-02-14 ENCOUNTER — TREATMENT (OUTPATIENT)
Dept: PHYSICAL THERAPY | Facility: CLINIC | Age: 69
End: 2019-02-14

## 2019-02-14 DIAGNOSIS — S29.019D ACUTE THORACIC MYOFASCIAL STRAIN, SUBSEQUENT ENCOUNTER: Primary | ICD-10-CM

## 2019-02-14 PROCEDURE — 97110 THERAPEUTIC EXERCISES: CPT | Performed by: PHYSICAL THERAPIST

## 2019-02-14 PROCEDURE — 97140 MANUAL THERAPY 1/> REGIONS: CPT | Performed by: PHYSICAL THERAPIST

## 2019-02-14 NOTE — PROGRESS NOTES
Re-Assessment / Re-Certification      Patient: Richard Dowling III   : 1950  Diagnosis/ICD-10 Code:  Acute thoracic myofascial strain, subsequent encounter [S29.019D]  Referring practitioner: Hermelindo Fraire MD  Date of Initial Visit: 1/15/2019  Today's Date: 2019  Patient seen for 8 sessions      Subjective:   Richard Dowling reports: Pain more tightness soreness causing awkward movements. Can lay supine now. I feel like I am not walking quite right. POssibly due to L hip. I went to gym and worked out twice since my last PT visit.  Subjective Questionnaire: Oswestry: 32%( was 38%)  Clinical Progress: improved  Home Program Compliance: Yes  Treatment has included: therapeutic exercise, neuromuscular re-education and manual therapy    Objective   Thoracic AROM:  Flexion 100%  Extension 50%  LROT 100*  RROT 100%  UE Strength:     Assessment/Plan   STG( 2 weeks) MET  1.Pt to be independent with HEP  2. Pt to exhibit improved thoracic segmental mobility to allow for increased ease with ADL's  3. Pt to tolerate supine lying  4. Pt to report less pain with sleeping at night    LTG ( 6 weeks)  1. Pt to demonstrate ability to lift 15# overhead  without thoracic pain to resume fitness  2. Pt to demonstrate proper sitting posture to decrease stress on Tspine  3. Pt to exhibit full cervical and thoracic AROM to  to allow for reaching and bending with min to no pain MET  4. Pt to score <10% on Back Index PROGRESSING  Progress toward previous goals: Partially Met        Recommendations: Continue as planned  Timeframe: 1 month  Prognosis to achieve goals: good    PT Signature: Bina Soto, PT  [place] License # [id number]    Based upon review of the patient's progress and continued therapy plan, it is my medical opinion that Richard Dowling should continue physical therapy treatment at [facility]  [address] [place] [id number] [phone].    Signature:  __________________________________  Hermelindo Fraire MD    Manual Therapy:    15     mins  71776;  Therapeutic Exercise:    25     mins  89670;     Neuromuscular Peyton:    5    mins  09431;    Therapeutic Activity:          mins  16016;     Gait Training:           mins  42507;     Ultrasound:          mins  30490;    Electrical Stimulation:         mins  99986 ( );  Dry Needling          mins self-pay    Timed Treatment:   45   mins   Total Treatment:     45   mins

## 2019-02-18 ENCOUNTER — TREATMENT (OUTPATIENT)
Dept: PHYSICAL THERAPY | Facility: CLINIC | Age: 69
End: 2019-02-18

## 2019-02-18 DIAGNOSIS — S29.019D ACUTE THORACIC MYOFASCIAL STRAIN, SUBSEQUENT ENCOUNTER: Primary | ICD-10-CM

## 2019-02-18 PROCEDURE — 97140 MANUAL THERAPY 1/> REGIONS: CPT | Performed by: PHYSICAL THERAPIST

## 2019-02-18 PROCEDURE — 97110 THERAPEUTIC EXERCISES: CPT | Performed by: PHYSICAL THERAPIST

## 2019-02-18 NOTE — PROGRESS NOTES
Physical Therapy Daily Progress Note        Subjective     Richard Dowling reports: Overdid it at gym after PT last sesion. Took a couple days but better now. Working on standing up straighter    Objective   See Exercise, Manual, and Modality Logs for complete treatment.       Assessment/Plan  DoIng well with therex.  with STM. Pt had difficulty with bridging exercise due to cramping on R hamstring.     Progress per Plan of Care           Manual Therapy:  20       mins  17921;  Therapeutic Exercise: 25      mins  31282;     Neuromuscular Peyton:       mins  29239;    Therapeutic Activity:         mins  40163;     Gait Training:         mins  17415;     Ultrasound:         mins  42617;    Electrical Stimulation:        mins  62618 ( );  Dry Needling         mins self-pay    Timed Treatment:   45   mins   Total Treatment:     45   mins    Bina Soto, PT  Physical Therapist  [place] License # [id number]

## 2019-02-21 ENCOUNTER — TREATMENT (OUTPATIENT)
Dept: PHYSICAL THERAPY | Facility: CLINIC | Age: 69
End: 2019-02-21

## 2019-02-21 DIAGNOSIS — S29.019D ACUTE THORACIC MYOFASCIAL STRAIN, SUBSEQUENT ENCOUNTER: Primary | ICD-10-CM

## 2019-02-21 PROCEDURE — 97140 MANUAL THERAPY 1/> REGIONS: CPT | Performed by: PHYSICAL THERAPIST

## 2019-02-21 PROCEDURE — 97110 THERAPEUTIC EXERCISES: CPT | Performed by: PHYSICAL THERAPIST

## 2019-02-21 NOTE — PROGRESS NOTES
Physical Therapy Daily Progress Note        Subjective     Richard Dowling reports: Feeling better. Mild soreness L midscap area. Doing well at gym with exercise machines    Objective   See Exercise, Manual, and Modality Logs for complete treatment.       Assessment/Plan  DOing well with improving postural awareness and less pain    Progress strengthening /stabilization /functional activity           Manual Therapy:  15       mins  69714;  Therapeutic Exercise: 20      mins  95224;     Neuromuscular Peyton:5       mins  60255;    Therapeutic Activity:         mins  65216;     Gait Training:         mins  20902;     Ultrasound:         mins  12958;    Electrical Stimulation:        mins  95936 ( );  Dry Needling         mins self-pay    Timed Treatment:   40   mins   Total Treatment:     40   mins    Bina Soto, PT  Physical Therapist  [place] License # [id number]

## 2019-02-26 ENCOUNTER — TREATMENT (OUTPATIENT)
Dept: PHYSICAL THERAPY | Facility: CLINIC | Age: 69
End: 2019-02-26

## 2019-02-26 DIAGNOSIS — S29.019D ACUTE THORACIC MYOFASCIAL STRAIN, SUBSEQUENT ENCOUNTER: Primary | ICD-10-CM

## 2019-02-26 PROCEDURE — 97110 THERAPEUTIC EXERCISES: CPT | Performed by: PHYSICAL THERAPIST

## 2019-02-26 PROCEDURE — 97140 MANUAL THERAPY 1/> REGIONS: CPT | Performed by: PHYSICAL THERAPIST

## 2019-02-26 NOTE — PROGRESS NOTES
Physical Therapy Daily Progress Note        Subjective     Richard Dowling reports: Overall doing well. L hip hasn't buckled at all on stairs but I have been veering a bit more with gait.    Objective   See Exercise, Manual, and Modality Logs for complete treatment.       Assessment/Plan  Improved segmental mobility T spine. Stiff lower back and quads. Reminded pt to do standing quad stretch    Progress per Plan of Care           Manual Therapy:  15       mins  30176;  Therapeutic Exercise: 25      mins  14944;     Neuromuscular Peyton:5       mins  90467;    Therapeutic Activity:         mins  97928;     Gait Training:         mins  24224;     Ultrasound:         mins  82045;    Electrical Stimulation:        mins  40103 ( );  Dry Needling         mins self-pay    Timed Treatment:   45   mins   Total Treatment:     45   mins    Bina Soto, PT  Physical Therapist  [place] License # [id number]

## 2019-02-28 ENCOUNTER — TREATMENT (OUTPATIENT)
Dept: PHYSICAL THERAPY | Facility: CLINIC | Age: 69
End: 2019-02-28

## 2019-02-28 DIAGNOSIS — S29.019D ACUTE THORACIC MYOFASCIAL STRAIN, SUBSEQUENT ENCOUNTER: Primary | ICD-10-CM

## 2019-02-28 PROCEDURE — 97110 THERAPEUTIC EXERCISES: CPT | Performed by: PHYSICAL THERAPIST

## 2019-02-28 PROCEDURE — 97140 MANUAL THERAPY 1/> REGIONS: CPT | Performed by: PHYSICAL THERAPIST

## 2019-03-05 ENCOUNTER — TREATMENT (OUTPATIENT)
Dept: PHYSICAL THERAPY | Facility: CLINIC | Age: 69
End: 2019-03-05

## 2019-03-05 DIAGNOSIS — S29.019D ACUTE THORACIC MYOFASCIAL STRAIN, SUBSEQUENT ENCOUNTER: Primary | ICD-10-CM

## 2019-03-05 PROCEDURE — 97110 THERAPEUTIC EXERCISES: CPT | Performed by: PHYSICAL THERAPIST

## 2019-03-05 PROCEDURE — 97140 MANUAL THERAPY 1/> REGIONS: CPT | Performed by: PHYSICAL THERAPIST

## 2019-03-05 NOTE — PROGRESS NOTES
DIscharge Note    Patient: Richard Dowling III   : 1950  Diagnosis/ICD-10 Code:  No primary diagnosis found.  Referring practitioner: Hermelindo Fraire MD  Date of Initial Visit: 1/15/2019  Today's Date: 3/5/2019  Patient seen for 13 sessions      Subjective:   Richard Dowling reports: Doing well. 2 episodes of L hip feeling like it's going to give way when ascending stairs  Subjective Questionnaire: Oswestry: 18%  Clinical Progress: improved  Home Program Compliance: Yes  Treatment has included: therapeutic exercise, neuromuscular re-education, manual therapy, ultrasound and moist heat    Objective     Assessment/Plan   STG( 2 weeks) MET  1.Pt to be independent with HEP  2. Pt to exhibit improved thoracic segmental mobility to allow for increased ease with ADL's  3. Pt to tolerate supine lying  4. Pt to report less pain with sleeping at night    LTG ( 6 weeks)  1. Pt to demonstrate ability to lift 15# overhead  without thoracic pain to resume fitness MET  2. Pt to demonstrate proper sitting posture to decrease stress on Tspine MET  3. Pt to exhibit full cervical and thoracic AROM to  to allow for reaching and bending with min to no pain MET  4. Pt to score <10% on Back Index NEARLY 16%  Progress toward previous goals: All Met        Recommendations: Discharge    PT Signature: Bina Soto, PT  [place] License # [id number]    Based upon review of the patient's progress and continued therapy plan, it is my medical opinion that Richard Dowling should continue physical therapy treatment at [facility]  [address] [place] [id number] [phone].    Signature: __________________________________  Hermelindo Fraire MD    Manual Therapy:    15     mins  26690;  Therapeutic Exercise:    30     mins  74880;     Neuromuscular Peyton:    5    mins  18084;    Therapeutic Activity:          mins  26200;     Gait Training:           mins  06379;     Ultrasound:           mins  80894;    Electrical Stimulation:         mins  86881 ( );  Dry Needling          mins self-pay    Timed Treatment:   50   mins   Total Treatment:     50   mins

## 2021-03-22 ENCOUNTER — BULK ORDERING (OUTPATIENT)
Dept: CASE MANAGEMENT | Facility: OTHER | Age: 71
End: 2021-03-22

## 2021-03-22 DIAGNOSIS — Z23 IMMUNIZATION DUE: ICD-10-CM

## 2022-06-30 ENCOUNTER — PRE-PROCEDURE SCREENING (OUTPATIENT)
Dept: GASTROENTEROLOGY | Facility: CLINIC | Age: 72
End: 2022-06-30

## 2022-06-30 NOTE — TELEPHONE ENCOUNTER
Last scope--12/21/17 in epic--Personal history of polyps--No family history of polyps--Family history of colon cancer ( father)--No blood thinners-Medications:                  aspirin 81 MG EC tablet  B Complex Vitamins (B COMPLEX 100 PO)  baclofen (LIORESAL) 10 MG tablet  Flaxseed, Linseed, (FLAXSEED OIL) 1000 MG capsule  Multiple Vitamins-Minerals (MULTI COMPLETE PO)  tamsulosin (FLOMAX) 0.4 MG capsule 24 hr capsule        Pt verbalized and understood that it would be few weeks before been schedule

## 2022-07-01 ENCOUNTER — PREP FOR SURGERY (OUTPATIENT)
Dept: OTHER | Facility: HOSPITAL | Age: 72
End: 2022-07-01

## 2022-07-01 DIAGNOSIS — Z80.0 FAMILY HISTORY OF GI MALIGNANCY: Primary | ICD-10-CM

## 2022-09-16 ENCOUNTER — TELEPHONE (OUTPATIENT)
Dept: GASTROENTEROLOGY | Facility: CLINIC | Age: 72
End: 2022-09-16

## 2022-09-16 NOTE — TELEPHONE ENCOUNTER
Caller: Dez Dowling III    Relationship: Self    Best call back number: [phone]    What is the best time to reach you: ANY    Who are you requesting to speak with (clinical staff, provider,  specific staff member): MATILDE    Do you know the name of the person who called: DEZ    What was the call regarding: PT WAS CALLED TO SCHEDULE A COLONOSCOPY    Do you require a callback: YES

## 2022-09-22 PROBLEM — Z80.0 FAMILY HISTORY OF GI MALIGNANCY: Status: ACTIVE | Noted: 2022-09-22

## 2022-09-22 NOTE — TELEPHONE ENCOUNTER
BHAVESH Montiel for COLONOSCOPY  on 12/6/22 arrive at 8am.Prep instructions mailed to address on file.

## 2022-11-30 ENCOUNTER — TELEPHONE (OUTPATIENT)
Dept: GASTROENTEROLOGY | Facility: CLINIC | Age: 72
End: 2022-11-30

## 2022-11-30 NOTE — TELEPHONE ENCOUNTER
Caller: Richard Dowling III    Relationship to patient: Self    Best call back number:[phone]    Patient is needing: PT IS SCHEDULED FOR COLONOSCOPY ON 12/06/22 PT NEEDS PREP INSTRUCTIONS SENT IN 7digital PLEASE

## 2022-12-06 ENCOUNTER — ANESTHESIA (OUTPATIENT)
Dept: GASTROENTEROLOGY | Facility: HOSPITAL | Age: 72
End: 2022-12-06

## 2022-12-06 ENCOUNTER — ANESTHESIA EVENT (OUTPATIENT)
Dept: GASTROENTEROLOGY | Facility: HOSPITAL | Age: 72
End: 2022-12-06

## 2022-12-06 ENCOUNTER — HOSPITAL ENCOUNTER (OUTPATIENT)
Facility: HOSPITAL | Age: 72
Setting detail: HOSPITAL OUTPATIENT SURGERY
Discharge: HOME | End: 2022-12-06
Attending: INTERNAL MEDICINE | Admitting: INTERNAL MEDICINE

## 2022-12-06 VITALS
RESPIRATION RATE: 15 BRPM | HEIGHT: 73 IN | BODY MASS INDEX: 33 KG/M2 | WEIGHT: 249 LBS | HEART RATE: 60 BPM | SYSTOLIC BLOOD PRESSURE: 132 MMHG | OXYGEN SATURATION: 96 % | TEMPERATURE: 98 F | DIASTOLIC BLOOD PRESSURE: 90 MMHG

## 2022-12-06 DIAGNOSIS — Z80.0 FAMILY HISTORY OF GI MALIGNANCY: ICD-10-CM

## 2022-12-06 PROCEDURE — 45385 COLONOSCOPY W/LESION REMOVAL: CPT | Performed by: INTERNAL MEDICINE

## 2022-12-06 PROCEDURE — 88305 TISSUE EXAM BY PATHOLOGIST: CPT | Performed by: INTERNAL MEDICINE

## 2022-12-06 PROCEDURE — 45380 COLONOSCOPY AND BIOPSY: CPT | Performed by: INTERNAL MEDICINE

## 2022-12-06 PROCEDURE — S0260 H&P FOR SURGERY: HCPCS | Performed by: INTERNAL MEDICINE

## 2022-12-06 PROCEDURE — 25010000002 PROPOFOL 10 MG/ML EMULSION: Performed by: ANESTHESIOLOGY

## 2022-12-06 RX ORDER — LIDOCAINE HYDROCHLORIDE 20 MG/ML
INJECTION, SOLUTION INFILTRATION; PERINEURAL AS NEEDED
Status: DISCONTINUED | OUTPATIENT
Start: 2022-12-06 | End: 2022-12-06 | Stop reason: SURG

## 2022-12-06 RX ORDER — PROPOFOL 10 MG/ML
VIAL (ML) INTRAVENOUS CONTINUOUS PRN
Status: DISCONTINUED | OUTPATIENT
Start: 2022-12-06 | End: 2022-12-06 | Stop reason: SURG

## 2022-12-06 RX ORDER — SODIUM CHLORIDE, SODIUM LACTATE, POTASSIUM CHLORIDE, CALCIUM CHLORIDE 600; 310; 30; 20 MG/100ML; MG/100ML; MG/100ML; MG/100ML
30 INJECTION, SOLUTION INTRAVENOUS CONTINUOUS PRN
Status: DISCONTINUED | OUTPATIENT
Start: 2022-12-06 | End: 2022-12-16 | Stop reason: HOSPADM

## 2022-12-06 RX ORDER — PROPOFOL 10 MG/ML
VIAL (ML) INTRAVENOUS AS NEEDED
Status: DISCONTINUED | OUTPATIENT
Start: 2022-12-06 | End: 2022-12-06 | Stop reason: SURG

## 2022-12-06 RX ADMIN — Medication 200 MCG/KG/MIN: at 09:41

## 2022-12-06 RX ADMIN — LIDOCAINE HYDROCHLORIDE 100 MG: 20 INJECTION, SOLUTION INFILTRATION; PERINEURAL at 09:40

## 2022-12-06 RX ADMIN — PROPOFOL 150 MG: 10 INJECTION, EMULSION INTRAVENOUS at 09:40

## 2022-12-06 RX ADMIN — SODIUM CHLORIDE, POTASSIUM CHLORIDE, SODIUM LACTATE AND CALCIUM CHLORIDE 30 ML/HR: 600; 310; 30; 20 INJECTION, SOLUTION INTRAVENOUS at 08:58

## 2022-12-06 NOTE — H&P
"[facility]  Pre Procedure History & Physical    Chief Complaint:   Time for my colonoscopy    Subjective     HPI:   72 y.o. male presenting to endoscopy unit today for surveillance colonoscopy.    Past Medical History:   Past Medical History:   Diagnosis Date   • Enlarged prostate    • Muscle wasting and atrophy, not elsewhere classified, left lower leg     unknown etiology       Family History:  Family History   Problem Relation Age of Onset   • Cancer Father    • Malig Hyperthermia Neg Hx        Social History:   reports that he has never smoked. He has never used smokeless tobacco. He reports current alcohol use of about 2.0 standard drinks per week. He reports that he does not use drugs.    Medications:   Medications Prior to Admission   Medication Sig Dispense Refill Last Dose   • B Complex Vitamins (B COMPLEX 100 PO) Take  by mouth.   12/5/2022   • Flaxseed, Linseed, (FLAXSEED OIL) 1000 MG capsule Take  by mouth.   12/5/2022   • Multiple Vitamins-Minerals (MULTI COMPLETE PO) Take  by mouth daily.   12/5/2022   • tamsulosin (FLOMAX) 0.4 MG capsule 24 hr capsule TK ONE C PO  QHS  0 12/5/2022       Allergies:  Sulfa antibiotics    ROS:    Pertinent items are noted in HPI     Objective     Blood pressure 113/66, pulse 62, temperature 98 °F (36.7 °C), resp. rate 16, height 185.4 cm (73\"), weight 113 kg (249 lb), SpO2 97 %.    Physical Exam   Constitutional: Pt is oriented to person, place, and time and well-developed, well-nourished, and in no distress.   Abdominal: Soft.   Psychiatric: Mood, memory, affect and judgment normal.     Assessment & Plan     Diagnosis:  Personal hx of colon polyps    Anticipated Surgical Procedure:  Colonoscopy    The risks, benefits, and alternatives of this procedure have been discussed with the patient or the responsible party- the patient understands and agrees to proceed.                                                                "

## 2022-12-06 NOTE — ANESTHESIA POSTPROCEDURE EVALUATION
"Patient: Richard Dowling III    Procedure Summary     Date: 12/06/22 Room / Location:  [facility] /  [facility]    Anesthesia Start: 0938 Anesthesia Stop: 1013    Procedure: COLONOSCOPY to cecum with biopsy / polypectomies Diagnosis:       Family history of GI malignancy      (Family history of GI malignancy [Z80.0])    Surgeons: Castillo Ledezma MD Provider: Boyd Nagy MD    Anesthesia Type: MAC ASA Status: 2          Anesthesia Type: MAC    Vitals  Vitals Value Taken Time   /90 12/06/22 1030   Temp     Pulse 60 12/06/22 1030   Resp 15 12/06/22 1030   SpO2 96 % 12/06/22 1030           Post Anesthesia Care and Evaluation    Patient location during evaluation: bedside  Patient participation: complete - patient participated  Level of consciousness: awake and alert  Pain management: adequate    Airway patency: patent  Anesthetic complications: No anesthetic complications    Cardiovascular status: acceptable  Respiratory status: acceptable  Hydration status: acceptable    Comments: /90 (BP Location: Left arm, Patient Position: Lying)   Pulse 60   Temp 36.7 °C (98 °F)   Resp 15   Ht 185.4 cm (73\")   Wt 113 kg (249 lb)   SpO2 96%   BMI 32.85 kg/m²           "

## 2022-12-06 NOTE — ANESTHESIA PREPROCEDURE EVALUATION
Anesthesia Evaluation     Patient summary reviewed and Nursing notes reviewed   NPO Solid Status: > 8 hours  NPO Liquid Status: > 4 hours           Airway   Mallampati: II  Neck ROM: full  No difficulty expected  Dental - normal exam     Pulmonary     breath sounds clear to auscultation  Cardiovascular     Rhythm: regular    (+) hyperlipidemia,       Neuro/Psych  GI/Hepatic/Renal/Endo    (+) obesity,       Musculoskeletal     Abdominal   (+) obese,    Substance History      OB/GYN          Other                        Anesthesia Plan    ASA 2     MAC     intravenous induction     Anesthetic plan, risks, benefits, and alternatives have been provided, discussed and informed consent has been obtained with: patient.        CODE STATUS:

## 2022-12-06 NOTE — DISCHARGE INSTRUCTIONS
For the next 24 hours patient needs to be with a responsible adult.    For 24 hours DO NOT drive, operate machinery, appliances, drink alcohol, make important decisions or sign legal documents.    Start with a light or bland diet if you are feeling sick to your stomach otherwise advance to regular diet as tolerated.    Follow recommendations on procedure report if provided by your doctor.    Call Dr. Ledezma for problems [phone].    Problems may include but not limited to: large amounts of bleeding, trouble breathing, repeated vomiting, severe unrelieved pain, fever or chills.

## 2022-12-07 LAB
LAB AP CASE REPORT: NORMAL
LAB AP CLINICAL INFORMATION: NORMAL
PATH REPORT.FINAL DX SPEC: NORMAL
PATH REPORT.GROSS SPEC: NORMAL

## 2023-01-26 ENCOUNTER — TELEPHONE (OUTPATIENT)
Dept: GASTROENTEROLOGY | Facility: CLINIC | Age: 73
End: 2023-01-26
Payer: MEDICARE

## 2023-01-26 NOTE — TELEPHONE ENCOUNTER
----- Message from Castillo Ledezma MD sent at 12/8/2022 12:03 PM EST -----  Tubular adenoma colon polyps  Colonoscopy recall 1 year

## (undated) DEVICE — CANN O2 ETCO2 FITS ALL CONN CO2 SMPL A/ 7IN DISP LF

## (undated) DEVICE — THE SINGLE USE ETRAP – POLYP TRAP IS USED FOR SUCTION RETRIEVAL OF ENDOSCOPICALLY REMOVED POLYPS.: Brand: ETRAP

## (undated) DEVICE — TUBING, SUCTION, 1/4" X 10', STRAIGHT: Brand: MEDLINE

## (undated) DEVICE — Device: Brand: DEFENDO AIR/WATER/SUCTION AND BIOPSY VALVE

## (undated) DEVICE — SENSR O2 OXIMAX FNGR A/ 18IN NONSTR

## (undated) DEVICE — TBG 02 CRUSH RESIST LF CLR 7FT

## (undated) DEVICE — CANN NASL CO2 TRULINK W/O2 A/

## (undated) DEVICE — KT ORCA ORCAPOD DISP STRL

## (undated) DEVICE — THE TORRENT IRRIGATION SCOPE CONNECTOR IS USED WITH THE TORRENT IRRIGATION TUBING TO PROVIDE IRRIGATION FLUIDS SUCH AS STERILE WATER DURING GASTROINTESTINAL ENDOSCOPIC PROCEDURES WHEN USED IN CONJUNCTION WITH AN IRRIGATION PUMP (OR ELECTROSURGICAL UNIT).: Brand: TORRENT

## (undated) DEVICE — ADAPT CLN BIOGUARD AIR/H2O DISP

## (undated) DEVICE — LN SMPL CO2 SHTRM SD STREAM W/M LUER

## (undated) DEVICE — SNAR POLYP SENSATION STDOVL 27 240 BX40

## (undated) DEVICE — SINGLE-USE BIOPSY FORCEPS: Brand: RADIAL JAW 4